# Patient Record
Sex: FEMALE | Race: WHITE | NOT HISPANIC OR LATINO | URBAN - METROPOLITAN AREA
[De-identification: names, ages, dates, MRNs, and addresses within clinical notes are randomized per-mention and may not be internally consistent; named-entity substitution may affect disease eponyms.]

---

## 2020-03-10 ENCOUNTER — OFFICE (OUTPATIENT)
Dept: URBAN - METROPOLITAN AREA CLINIC 19 | Facility: CLINIC | Age: 25
End: 2020-03-10

## 2020-03-10 VITALS
SYSTOLIC BLOOD PRESSURE: 126 MMHG | WEIGHT: 206 LBS | DIASTOLIC BLOOD PRESSURE: 86 MMHG | HEIGHT: 64 IN | HEART RATE: 85 BPM

## 2020-03-10 DIAGNOSIS — R14.0 ABDOMINAL DISTENSION (GASEOUS): ICD-10-CM

## 2020-03-10 DIAGNOSIS — R12 HEARTBURN: ICD-10-CM

## 2020-03-10 DIAGNOSIS — R10.11 RIGHT UPPER QUADRANT PAIN: ICD-10-CM

## 2020-03-10 DIAGNOSIS — R19.8 OTHER SPECIFIED SYMPTOMS AND SIGNS INVOLVING THE DIGESTIVE S: ICD-10-CM

## 2020-03-10 PROCEDURE — 99203 OFFICE O/P NEW LOW 30 MIN: CPT

## 2020-03-10 NOTE — SERVICEHPINOTES
24-year-old single white female here for evaluation of intermittent RUQ pain, mild change in bowel habits, and a "knot in stomach."  Around 6 days ago she ate Mexican and drink 4 Margaritas with her friends.  The next day she woke up with nonbloody diarrhea.  Initially she thought this was result of her drinking several margaritas.  She also started having intermittent RUQ pain.  She went to an urgent care to have this evaluated and they sent her to the ED at [facility] on 3/7/20 for further evaluation.  Labwork, AUS and CT abd/pelvis were all unremarkable.   She was told that she had a lot of stool backed up and was encouraged to take Nexium for reflux and a laxative for constipation. Pain in her RUQ seems to be most significant after she eats.  Her reflux has improved since starting over-the-counter Nexium. She is taking MiraLax for some constipation.  Her bowels seem to alternate between looser and "heavier" stools to firm stools with straining.    She also has a "knot" in her stomach that is just to the right of her belly button.   She was initially concerned about this but CT abdomen and pelvis was normal at the ED. Prior to the symptoms starting on 3/5/20 she denies any previous GI illnesses or studies.  Family history is negative for colon neoplasm.

## 2020-03-10 NOTE — SERVICENOTES
The patient's assessment was reviewed with Dr. Barrera and a collaborative plan of care was established.